# Patient Record
Sex: MALE | Race: WHITE | NOT HISPANIC OR LATINO | Employment: OTHER | URBAN - METROPOLITAN AREA
[De-identification: names, ages, dates, MRNs, and addresses within clinical notes are randomized per-mention and may not be internally consistent; named-entity substitution may affect disease eponyms.]

---

## 2017-01-12 ENCOUNTER — OFFICE VISIT (OUTPATIENT)
Dept: UROLOGY | Facility: CLINIC | Age: 82
End: 2017-01-12
Payer: MEDICARE

## 2017-01-12 DIAGNOSIS — C60.9 PENILE CANCER: Primary | ICD-10-CM

## 2017-01-12 PROCEDURE — 99499 UNLISTED E&M SERVICE: CPT | Mod: S$GLB,,, | Performed by: STUDENT IN AN ORGANIZED HEALTH CARE EDUCATION/TRAINING PROGRAM

## 2017-01-12 RX ORDER — HEPARIN SODIUM 5000 [USP'U]/ML
5000 INJECTION, SOLUTION INTRAVENOUS; SUBCUTANEOUS EVERY 8 HOURS
Status: CANCELLED | OUTPATIENT
Start: 2017-01-12

## 2017-01-12 NOTE — PROGRESS NOTES
Urology ([facility]) H&P  Staff: Ayan Resendiz MD    Is this patient in a research study?  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  He re-presented in 06/2015 with inguinal adenopathy a suprapubic mass and underwent right superficial and deep inguinal LND and left superficial inguinal LND 7/15/2015.  Pathology positive for 4.5 cm focus of SCC in suprapubic area with positive lateral and deep margin, metastatic SCC in 1/9 right inguinal nodes with extranodal extension and 1/6 left inguinal nodes with extranodal extension.      CT of the pelvis on 12/16/15 revealed multiple new soft tissue attenuation lesions noted in the groin and at the base of the penile stump. There is a lesion within the stump of the penis measuring 3.6 cm. This was not present on prior study. A lesion just left of the base of the penis measures up to 5.1 cm in diameter. Right inguinal lesion adjacent to surgical clips measures 2.5 cm in diameter. Heterogeneous collection extending along the left external iliac vessels into the left pelvic sidewall is also seen. These lesions demonstrate internal hypoattenuation suggesting necrosis. These findings were concerning for recurrent disease.       The patient was seen in multidisciplinary clinic in January of this year. We recommended further therapy with chemoradiotherapy. The patient subsequently completed 46 Gy to the pelvis followed by 20 Gy boost to the involved lymph nodes on 3/24/16 at [facility]. He did receive chemotherapy with his radiotherapy but the type is unknown. Since that time, the patient has been doing fairly well without evidence of progression. He had a recent CT in [place] per Dr. Wang.       The patient notes he has noted a skin nodule on the abdomen since the  beginning of December 2016. On last examination he was noted to have 3 masses on the abdominal wall. PET scan 12/15 demonstrated that his abdominal wall lesions are hypermetabolic.  There is also a superficial hypermetabolic lesion over his right hip.  He also has scrotal lesion to the level of the base of the penis on the left.      ROS:  Neg except per HPI    Past Medical History   Diagnosis Date    Allergy     Benign colon polyp     Benign essential hypertension     Cataracts, both eyes      Removed    Non-small cell cancer of left lung     Squamous cell carcinoma of penis        Past Surgical History   Procedure Laterality Date    Hernia repair      Lung lobectomy Left      Partial lung removed     Cataract extraction Bilateral     Appendectomy      Colon surgery      Cystoscopy      Penis surgery  4/13/2015     Partial penectomy for penile cancer     Lymph node dissection Bilateral 7/15/2015     Bilateral superficial inguinal lymph node dissection       Social History     Social History    Marital status:      Spouse name: N/A    Number of children: N/A    Years of education: N/A     Social History Main Topics    Smoking status: Former Smoker     Packs/day: 3.00     Years: 40.00     Types: Cigarettes     Quit date: 2/19/1985    Smokeless tobacco: Current User    Alcohol use 0.0 oz/week     0 Standard drinks or equivalent per week      Comment: 1-2 a day    Drug use: No    Sexual activity: Not Currently     Partners: Female     Other Topics Concern    Not on file     Social History Narrative       Family History   Problem Relation Age of Onset    Diabetes Mother        Review of patient's allergies indicates:   Allergen Reactions    Penicillins Other (See Comments)     Unknown         Current Outpatient Prescriptions on File Prior to Visit   Medication Sig Dispense Refill    aspirin (ECOTRIN) 81 MG EC tablet Take 81 mg by mouth once daily.      docusate sodium (COLACE) 50 MG  capsule Take 1 capsule (50 mg total) by mouth 2 (two) times daily. 60 capsule 0    magnesium oxide (MAG-OX) 400 mg tablet Take 2 tablets by mouth 3 (three) times daily.  3    silver sulfADIAZINE 1% (SILVADENE) 1 % cream       VIT C/VIT E/LUTEIN/MIN/OMEGA-3 (OCUVITE ORAL) Take by mouth.       No current facility-administered medications on file prior to visit.        Anticoagulation:  Yes aspirin, will hold    Physical Exam:  weight 202 lb/91.6 kg  BMI 35    AAOx4,  NC/AT, EOMI, PER, sclerae anicteric, edentulous   Nl effort  RRR  Soft, non-tender, non-distended  Three superficial soft tissue masses under pannus in suprapubic region ranging from 2-6cm  Penile stump retracted into edematous scrotum, testes and cord structures difficult to palpate  Superficial soft tissue mass overlying right hip, approx 2 cm    Labs:      Lab Results   Component Value Date    WBC 7.90 12/16/2015    HGB 12.5 (L) 12/16/2015    HCT 39.2 (L) 12/16/2015    MCV 89 12/16/2015     12/16/2015       BMP  Lab Results   Component Value Date     12/16/2015    K 4.8 12/16/2015     12/16/2015    CO2 26 12/16/2015    BUN 15 12/16/2015    CREATININE 1.0 12/16/2015    CALCIUM 9.1 12/16/2015    ANIONGAP 13 12/16/2015    ESTGFRAFRICA >60.0 12/16/2015    EGFRNONAA >60.0 12/16/2015       No results found for: PSA    Imaging:      PET CT 12/15/2016:  Multiple hypermetabolic subcutaneous soft tissue masses along lower anterior abdominal wall and overlying right hip      In this patient with a history of penile cancer, there are multiple subcutaneous soft tissue densities along the anterior abdominal wall and the posterior right thigh which demonstrate hypermetabolic activity.  Of these, the lesion demonstrating the greatest intensity is at the level of the pubic symphysis to the right of the midline, SUV max measures 29.1.  The most superior abdominal wall lesion lies just above the umbilicus.  Additionally, fluid density lesions are  seen as described on prior abdominal CT 12/16/2015.  The right inguinal lesion demonstrates no hypermetabolic activity.  The larger left lesion adjacent to the base of the penis demonstrates hypermetabolic activity at its inferior most aspect with SUV max of 18.2.  This appearance is consistent with a necrotic node.  A right external iliac chain lymph node demonstrates increased uptake with SUV max of 9.2.  There is extensive body wall edema of the lower abdominal wall, scrotum, and medial thighs.    Incidental CT findings:  There is calcific atherosclerosis of the aorta, branch vessels, and coronary arteries.  There is scarring at the right lung apex and subsegmental atelectasis on the left.  There are postsurgical changes in the bilateral inguinal regions.  There are degenerative changes of the spine.      Assessment: Antonio Jain is a 84 y.o. male with metastatic SCC of the penis with multiple subcutaneous soft tissue masses     Plan:     1. To OR on 1/23/2016 for excision of subcutaneous soft tissue masses of anterior abdominal wall, possibly right lateral thigh, any indicated procedure  2. Consents signed   3. I have explained the risk, benefits, and alternatives of the procedure in detail.  I had a long discussion with the patient and his daughter during which we discussed that these lesions are most likely metastatic penile cancer, that this surgery will not cure him of penile cancer, and that even if we are able to remove these lesions in their entirety, there is a good chance that they will come back or that more lesions will grow in different locations.  We discussed the risks of chronic, non-healing wounds, need for further surgeries, risk of infection, sepsis, and death.  The patient voices understanding that this surgery is not meant to be curative, and he understands the aforementioned risks completely.  He knows that the alternative would be no surgical intervention, and that there is no immediate  risk of not undergoing surgical intervention.  The patient voices understanding and all questions have been answered. The patient agrees to proceed as planned.       Brooks Vasquez MD

## 2017-01-13 ENCOUNTER — ANESTHESIA EVENT (OUTPATIENT)
Dept: SURGERY | Facility: HOSPITAL | Age: 82
DRG: 571 | End: 2017-01-13
Payer: MEDICARE

## 2017-01-13 NOTE — ANESTHESIA PREPROCEDURE EVALUATION
Pre Admission Screening  Johanna Tang RN      []Hide copied text  Anesthesia Assessment: Preoperative EQUATION     Planned Procedure: Procedure(s) (LRB):  REPAIR-ABDOMINAL WALL/ resection of abdominal wall and suprapubic metastasis/ open (N/A)  Requested Anesthesia Type:General  Surgeon: Ayan Resendiz MD  Service: Urology  Known or anticipated Date of Surgery:1/23/2017     Surgeon notes: reviewed     Electronic QUestionnaire Assessment completed via nurse interview with patient.                    Antonio Jain [[id number]] - 84 y.o. Male         Providers Outside of [facility]             Admission (Pending) from 1/23/2017 in [facility]      Has outside PCP   Yes        Surgical Risk Level       Surgical Risk Level:   0              caRDScore (Clinical Anesthesia Rapid Decision Score)         Very Very Low  Total Score: 5       5 Sum of Clinical Scores        caRDScores (Grouped)       caRDScore - Ane:   0                       caRDScore - CVD:   0                       caRDScore - Pul:   2                       caRDScore - Met:   0                       caRDScore - Phy:   3              caRDScore Items             Admission (Pending) from 1/23/2017 in [facility]      Anesthesia        CVD        Activity similar to best ability for maximal activity or exercise   METS 4      Pulmonary        Total smoking adds up to 20 - 40 years   Yes      Metabolic        Physiologic        Obesity Status   Mild Obesity (BMI 30-34.9)      Had major surgery for Ca with resection of vital organ tissue (Brain, Lung, Liver, Kidney-please specify)   Yes [lung cancer approx 13 yrs ago-left lobe resection]      Cancer currently disseminated (metastatic)   Yes        Flags       Red Flag Score:   0                       Yellow Flag Score:   3              Red Flags             Admission (Pending) from 1/23/2017 in [facility]      Obesity Status   Mild Obesity (BMI 30-34.9)         Yellow Flags             Admission (Pending) from 1/23/2017 in [facility]      Takes herbal medications or vitamin supplements   Yes [magnesium]      Obesity Status   Mild Obesity (BMI 30-34.9)      Is or has been a Smoker   Yes      Cancer currently disseminated (metastatic)   Yes        PONV Risk Score (assumes periop narcotic use = +1, Max=4)       PONV Risk Score:   2              PONV Risk Factors  Total Score: 1       1 Non-Smoker at present        Sleep Apnea  Total Score: 0         KAILYN STOP-Bang Risk Factors (Max=8)  Total Score: 2       1 Age >50      1 Male        KAILYN Risk Level - 1 (Low), 2 (Moderate), 3 (High)       KALIYN Risk Level:   1              RCRI (Revised Cardiac Risk Indices of ACC/AHA guidelines, Max=6)  Total Score: 0         CAD Risk Factors  Total Score: 2       1 Male. Age >45      1 Total smoking adds up to 20 - 40 years        CHADS Score if applicable (history of atrial fib/flutter, Max=6)  Total Score: 1       1 Age >75        Maximal Exercise Capacity             Admission (Pending) from 1/23/2017 in [facility]      Maximal Exercise Capacity   METS 4        Summary of Dependence  Total Score: 1       1 Is totally independent of others for activities of daily living        Phone Fraility Score (Max = 17)  Total Score: 0         Pain Factors       No data to display        Risk Triggers (Evidence-Based Risk Triggers)         Pulmonary Risk Triggers  Total Score: 3       1 Age > or = 60      1 Obesity Status: Mild Obesity (BMI 30-34.9)      1 Total smoking adds up to 20 - 40 years        Renal Risk Triggers  Total Score: 0         Delirium Risk Triggers  Total Score: 1       1 Age > or = 75        Urologic Risk Triggers  Total Score: 0         Logistics         Pre-op Clinic Logistics  Total Score: 5       1 Has outside PCP      2 Takes herbal medications or vitamin supplements      1 Has had anesthesia, either as adult or as a child      1  Previous transfusions        [facility] Logistics  Total Score: 0         Discharge Logistics  Total Score: 0         Discharge Planning             Admission (Pending) from 1/23/2017 in [facility]      Discharge Planning        Will assist patient 24/7, if needed   daughter      Who will transport you to therapy, if need   daughter        Fast Track <For office use only>       Total Score: 6          Surgical Risk Level Assessment                    Triage considerations:      The patient has no apparent active cardiac condition (No unstable coronary Syndrome such as severe unstable angina or recent [<1 month] myocardial infarction, decompensated CHF, severe valvular disease or significant arrhythmia)     Previous anesthesia records:GETA, MAC and No problems 7/2015 lymph node dissection Method of Intubation: Direct laryngoscopy; Inserted by: Anesthesia Resident; Airway Device: Endotracheal Tube; Mask Ventilation: Easy - oral; Intubated: Postinduction; Blade: Bowman #2; Airway Device Size: 8.0; Cuff Inflation: Minimal occlusive pressure; Placement Verified By: Auscultation, Capnometry; Grade: Grade I; Complicating Factors: None; Intubation Findings: Positive EtCO2, Bilateral breath sounds, Atraumatic/Condition of teeth unchanged;  Depth of Insertion: 24; Securment: Lips; Complications: None; Breath Sounds: Equal Bilateral; Insertion Attempts: 1; Removal Date: 07/15/15;      Last PCP note: 3-6 months ago , outside [facility]   Subspecialty notes: Hematology/Oncology     Other important co-morbidities: penile cancer/abdominal lesions, HX lung cancer/resection, HX HTN      Tests already available:  Available tests, > 1 year ago . 2015 EKG, BMP and CBC      Instructions given. (See in Nurse's note)     Optimization:  Anesthesia Preop Clinic Assessment Indicated-pt lives out of town and has prior anesthesia record      Plan:   Testing: Hematology Profile and BMP  Patient has previously scheduled Medical  Appointment:none     Navigation: Tests Scheduled. Am of surgery      Straight Line to surgery.       Electronically signed by Johanna Tang RN at 1/13/2017 11:46 AM        Admission (Pending) on 1/23/2017              Detailed Report                                                                                                                        01/13/2017  Antonio Jain is a 84 y.o., male with PMHx significant for HTN, NScCA L lung s/p partial lobectomy, SCC-glans penis s/p partial penectomy (4/13/15) and excision suprapubic mass and B inguinal lymph node dissection (7/15/15) and s/p chemoradiotherapy who presents with multiple metabolically active new lesions. Pt is scheduled for resection of cutaneous lesions and possible L inguinal mass resection.         Pre-operative evaluation for Procedure(s) (LRB):  REPAIR-ABDOMINAL WALL/ resection of abdominal wall and suprapubic metastasis/ open (N/A)    Antonio Jain is a 84 y.o. male     Patient Active Problem List   Diagnosis    Phimosis    ANA (acute kidney injury)    Hyperkalemia    Benign essential hypertension    Squamous cell carcinoma of penis    Dehydration    Regional lymph node metastasis present       Review of patient's allergies indicates:   Allergen Reactions    Penicillins Other (See Comments)     Unknown         No current facility-administered medications on file prior to encounter.      Current Outpatient Prescriptions on File Prior to Encounter   Medication Sig Dispense Refill    magnesium oxide (MAG-OX) 400 mg tablet Take 2 tablets by mouth 3 (three) times daily.  3    VIT C/VIT E/LUTEIN/MIN/OMEGA-3 (OCUVITE ORAL) Take by mouth.      aspirin (ECOTRIN) 81 MG EC tablet Take 81 mg by mouth once daily.      docusate sodium (COLACE) 50 MG capsule Take 1 capsule (50 mg total) by mouth 2 (two) times daily. 60 capsule 0    silver sulfADIAZINE 1% (SILVADENE) 1 % cream          Past Surgical History   Procedure Laterality Date    Hernia  repair      Lung lobectomy Left      Partial lung removed     Cataract extraction Bilateral     Appendectomy      Colon surgery      Cystoscopy      Penis surgery  2015     Partial penectomy for penile cancer     Lymph node dissection Bilateral 7/15/2015     Bilateral superficial inguinal lymph node dissection       Social History     Social History    Marital status:      Spouse name: N/A    Number of children: N/A    Years of education: N/A     Occupational History    Not on file.     Social History Main Topics    Smoking status: Former Smoker     Packs/day: 3.00     Years: 40.00     Types: Cigarettes     Quit date: 1985    Smokeless tobacco: Current User    Alcohol use 0.0 oz/week     0 Standard drinks or equivalent per week      Comment: 1-2 a day    Drug use: No    Sexual activity: Not Currently     Partners: Female     Other Topics Concern    Not on file     Social History Narrative         Vital Signs Range (Last 24H):         CBC: No results for input(s): WBC, RBC, HGB, HCT, PLT, MCV, MCH, MCHC in the last 72 hours.    CMP: No results for input(s): NA, K, CL, CO2, BUN, CREATININE, GLU, MG, PHOS, CALCIUM, ALBUMIN, PROT, ALKPHOS, ALT, AST, BILITOT in the last 72 hours.    INR  No results for input(s): INR, PROTIME, APTT in the last 72 hours.    Invalid input(s): PT        Diagnostic Studies:      EK17  Vent. rate 181 BPM  MI interval 192 ms  QRS duration 110 ms  QT/QTc 190/329 ms  P-R-T axes 71 -45 77  /56  Sinus tachycardia with frequent Premature ventricular complexes in a pattern of bigeminy  Left axis deviation  Otherwise normal ECG          [facility] Anesthesia Evaluation    I have reviewed the Patient Summary Reports.    I have reviewed the Nursing Notes.   I have reviewed the Medications.     Review of Systems  Anesthesia Hx:  No problems with previous Anesthesia Denies Hx of Anesthetic complications  History of prior surgery of interest to airway management or  planning: Denies Family Hx of Anesthesia complications.   Denies Personal Hx of Anesthesia complications.   Hematology/Oncology:  Hematology Normal       -- Cancer in past history:  chemotherapy and surgery  Oncology Comments: Squamous cell carcinoma of the penis     EENT/Dental:EENT/Dental Normal   Cardiovascular:   Hypertension, well controlled NYHA Classification I ECG has been reviewed.    Pulmonary:  Pulmonary Normal    Renal/:   Chronic Renal Disease, CRI    Hepatic/GI:  Hepatic/GI Normal    Musculoskeletal:  Musculoskeletal Normal    Neurological:  Neurology Normal    Endocrine:  Endocrine Normal    Dermatological:  Skin Normal    Psych:  Psychiatric Normal           Physical Exam  General:  Well nourished    Airway/Jaw/Neck:  Airway Findings: Mouth Opening: Normal Tongue: Normal  General Airway Assessment: Adult  Mallampati: II  Jaw/Neck Findings:  Neck ROM: Normal ROM     Eyes/Ears/Nose:  Eyes/Ears/Nose Findings:    Dental:  Dental Findings: In tact   Chest/Lungs:  Chest/Lungs Findings: Clear to auscultation, Normal Respiratory Rate     Heart/Vascular:  Heart Findings: Rate: Normal  Rhythm: Regular Rhythm  Sounds: Normal  Heart Murmur  Vascular Findings:        Mental Status:  Mental Status Findings:  Cooperative, Alert and Oriented         Anesthesia Plan  Type of Anesthesia, risks & benefits discussed:  Anesthesia Type:  general  Patient's Preference: General  Intra-op Monitoring Plan:   Intra-op Monitoring Plan Comments:   Post Op Pain Control Plan:   Post Op Pain Control Plan Comments:   Induction:   IV  Beta Blocker:  Patient is not currently on a Beta-Blocker (No further documentation required).       Informed Consent: Patient understands risks and agrees with Anesthesia plan.  Questions answered. Anesthesia consent signed with patient.  ASA Score: 3     Day of Surgery Review of History & Physical:    H&P update referred to the surgeon.     Anesthesia Plan Notes: Discussed plan for general  endotracheal anesthesia, pt understands and agrees with plan        Ready For Surgery From Anesthesia Perspective.

## 2017-01-23 ENCOUNTER — ANESTHESIA (OUTPATIENT)
Dept: SURGERY | Facility: HOSPITAL | Age: 82
DRG: 571 | End: 2017-01-23
Payer: MEDICARE

## 2017-01-23 PROBLEM — R22.2 ABDOMINAL WALL MASS: Status: ACTIVE | Noted: 2017-01-23

## 2017-01-23 PROBLEM — C60.9 PENILE CANCER: Status: ACTIVE | Noted: 2017-01-23

## 2017-01-23 PROBLEM — L02.211 ABDOMINAL WALL ABSCESS: Status: ACTIVE | Noted: 2017-01-23

## 2017-01-23 PROCEDURE — 63600175 PHARM REV CODE 636 W HCPCS: Performed by: ANESTHESIOLOGY

## 2017-01-23 PROCEDURE — D9220A PRA ANESTHESIA: Mod: ,,, | Performed by: ANESTHESIOLOGY

## 2017-01-23 PROCEDURE — 25000003 PHARM REV CODE 250: Performed by: ANESTHESIOLOGY

## 2017-01-23 RX ORDER — CLINDAMYCIN PHOSPHATE 150 MG/ML
INJECTION, SOLUTION INTRAVENOUS
Status: DISCONTINUED | OUTPATIENT
Start: 2017-01-23 | End: 2017-01-23

## 2017-01-23 RX ORDER — GLYCOPYRROLATE 0.2 MG/ML
INJECTION INTRAMUSCULAR; INTRAVENOUS
Status: DISCONTINUED | OUTPATIENT
Start: 2017-01-23 | End: 2017-01-23

## 2017-01-23 RX ORDER — ONDANSETRON 2 MG/ML
INJECTION INTRAMUSCULAR; INTRAVENOUS
Status: DISCONTINUED | OUTPATIENT
Start: 2017-01-23 | End: 2017-01-23

## 2017-01-23 RX ORDER — NEOSTIGMINE METHYLSULFATE 1 MG/ML
INJECTION, SOLUTION INTRAVENOUS
Status: DISCONTINUED | OUTPATIENT
Start: 2017-01-23 | End: 2017-01-23

## 2017-01-23 RX ORDER — DEXAMETHASONE SODIUM PHOSPHATE 4 MG/ML
INJECTION, SOLUTION INTRA-ARTICULAR; INTRALESIONAL; INTRAMUSCULAR; INTRAVENOUS; SOFT TISSUE
Status: DISCONTINUED | OUTPATIENT
Start: 2017-01-23 | End: 2017-01-23

## 2017-01-23 RX ORDER — ROCURONIUM BROMIDE 10 MG/ML
INJECTION, SOLUTION INTRAVENOUS
Status: DISCONTINUED | OUTPATIENT
Start: 2017-01-23 | End: 2017-01-23

## 2017-01-23 RX ORDER — KETAMINE HCL IN 0.9 % NACL 50 MG/5 ML
SYRINGE (ML) INTRAVENOUS
Status: DISCONTINUED | OUTPATIENT
Start: 2017-01-23 | End: 2017-01-23

## 2017-01-23 RX ORDER — PHENYLEPHRINE HYDROCHLORIDE 10 MG/ML
INJECTION INTRAVENOUS
Status: DISCONTINUED | OUTPATIENT
Start: 2017-01-23 | End: 2017-01-23

## 2017-01-23 RX ORDER — ACETAMINOPHEN 10 MG/ML
INJECTION, SOLUTION INTRAVENOUS
Status: DISCONTINUED | OUTPATIENT
Start: 2017-01-23 | End: 2017-01-23

## 2017-01-23 RX ORDER — LIDOCAINE HYDROCHLORIDE ANHYDROUS AND DEXTROSE MONOHYDRATE .8; 5 G/100ML; G/100ML
INJECTION, SOLUTION INTRAVENOUS CONTINUOUS PRN
Status: DISCONTINUED | OUTPATIENT
Start: 2017-01-23 | End: 2017-01-23

## 2017-01-23 RX ORDER — SODIUM CHLORIDE 9 MG/ML
INJECTION, SOLUTION INTRAVENOUS CONTINUOUS PRN
Status: DISCONTINUED | OUTPATIENT
Start: 2017-01-23 | End: 2017-01-23

## 2017-01-23 RX ADMIN — ROCURONIUM BROMIDE 40 MG: 10 INJECTION, SOLUTION INTRAVENOUS at 12:01

## 2017-01-23 RX ADMIN — GLYCOPYRROLATE 0.6 MG: 0.2 INJECTION, SOLUTION INTRAMUSCULAR; INTRAVENOUS at 01:01

## 2017-01-23 RX ADMIN — ROCURONIUM BROMIDE 10 MG: 10 INJECTION, SOLUTION INTRAVENOUS at 01:01

## 2017-01-23 RX ADMIN — DEXMEDETOMIDINE HYDROCHLORIDE 0.5 MCG/KG/HR: 100 INJECTION, SOLUTION, CONCENTRATE INTRAVENOUS at 12:01

## 2017-01-23 RX ADMIN — DEXAMETHASONE SODIUM PHOSPHATE 8 MG: 4 INJECTION, SOLUTION INTRAMUSCULAR; INTRAVENOUS at 12:01

## 2017-01-23 RX ADMIN — PHENYLEPHRINE HYDROCHLORIDE 100 MCG: 10 INJECTION INTRAVENOUS at 01:01

## 2017-01-23 RX ADMIN — SODIUM CHLORIDE: 0.9 INJECTION, SOLUTION INTRAVENOUS at 12:01

## 2017-01-23 RX ADMIN — CLINDAMYCIN PHOSPHATE 900 MG: 150 INJECTION, SOLUTION INTRAMUSCULAR; INTRAVENOUS at 12:01

## 2017-01-23 RX ADMIN — LIDOCAINE HYDROCHLORIDE 0.01 MG/KG/MIN: 8 INJECTION, SOLUTION INTRAVENOUS at 12:01

## 2017-01-23 RX ADMIN — NEOSTIGMINE METHYLSULFATE 5 MG: 1 INJECTION INTRAVENOUS at 01:01

## 2017-01-23 RX ADMIN — ONDANSETRON 4 MG: 2 INJECTION INTRAMUSCULAR; INTRAVENOUS at 12:01

## 2017-01-23 RX ADMIN — Medication 30 MG: at 12:01

## 2017-01-23 RX ADMIN — ACETAMINOPHEN 1000 MG: 10 INJECTION, SOLUTION INTRAVENOUS at 12:01

## 2017-01-23 NOTE — TRANSFER OF CARE
"Anesthesia Transfer of Care Note    Patient: Antonio Jain    Procedure(s) Performed: Procedure(s) (LRB):  REPAIR-ABDOMINAL WALL/ resection of abdominal wall and suprapubic metastasis/ open (N/A)    Patient location: PACU    Anesthesia Type: general    Transport from OR: Transported from OR on 6-10 L/min O2 by face mask with adequate spontaneous ventilation    Post pain: adequate analgesia    Post assessment: no apparent anesthetic complications    Post vital signs: stable    Level of consciousness: awake    Nausea/Vomiting: no nausea/vomiting    Complications: none          Last vitals:   Visit Vitals    BP (!) 177/73 (BP Location: Left arm, Patient Position: Lying, BP Method: Automatic)    Pulse 82    Temp 36.6 °C (97.8 °F) (Oral)    Ht 5' 4" (1.626 m)    Wt 105.2 kg (232 lb)    SpO2 98%    BMI 39.82 kg/m2     "

## 2017-01-24 NOTE — ANESTHESIA RELEASE NOTE
"Anesthesia Release from PACU Note    Patient: Antonio Jain    Procedure(s) Performed: Procedure(s) (LRB):  REPAIR-ABDOMINAL WALL/ resection of abdominal wall and suprapubic metastasis/ open (N/A)    Anesthesia type: general    Post pain: Adequate analgesia    Post assessment: no apparent anesthetic complications    Last Vitals:   Visit Vitals    BP (!) 132/58    Pulse 83    Temp 36.4 °C (97.5 °F) (Axillary)    Resp 15    Ht 5' 4" (1.626 m)    Wt 105.2 kg (232 lb)    SpO2 95%    BMI 39.82 kg/m2       Post vital signs: stable    Level of consciousness: awake, alert  and oriented    Nausea/Vomiting: no nausea/no vomiting    Complications: none    Airway Patency: patent    Respiratory: unassisted    Cardiovascular: stable and blood pressure at baseline    Hydration: euvolemic  "

## 2017-01-24 NOTE — ANESTHESIA POSTPROCEDURE EVALUATION
"Anesthesia Post Evaluation    Patient: Antonio Jain    Procedure(s) Performed: Procedure(s) (LRB):  REPAIR-ABDOMINAL WALL/ resection of abdominal wall and suprapubic metastasis/ open (N/A)    Final Anesthesia Type: general  Patient location during evaluation: PACU  Patient participation: Yes- Able to Participate  Level of consciousness: awake and alert  Post-procedure vital signs: reviewed and stable  Pain management: adequate  Airway patency: patent  PONV status at discharge: No PONV  Anesthetic complications: no      Cardiovascular status: blood pressure returned to baseline  Respiratory status: unassisted  Hydration status: euvolemic  Follow-up not needed.        Visit Vitals    BP (!) 132/58    Pulse 83    Temp 36.4 °C (97.5 °F) (Axillary)    Resp 15    Ht 5' 4" (1.626 m)    Wt 105.2 kg (232 lb)    SpO2 95%    BMI 39.82 kg/m2       Pain/Rodriguez Score: Pain Assessment Performed: Yes (1/23/2017  3:10 PM)  Presence of Pain: denies (1/23/2017  3:10 PM)      "

## 2017-01-27 ENCOUNTER — OFFICE VISIT (OUTPATIENT)
Dept: UROLOGY | Facility: CLINIC | Age: 82
End: 2017-01-27
Payer: MEDICARE

## 2017-01-27 DIAGNOSIS — Z98.890 POST-OPERATIVE STATE: Primary | ICD-10-CM

## 2017-01-27 PROCEDURE — 99999 PR PBB SHADOW E&M-EST. PATIENT-LVL II: CPT | Mod: PBBFAC,,, | Performed by: NURSE PRACTITIONER

## 2017-01-27 PROCEDURE — 99024 POSTOP FOLLOW-UP VISIT: CPT | Mod: S$GLB,,, | Performed by: NURSE PRACTITIONER

## 2017-01-27 NOTE — MR AVS SNAPSHOT
[facility] 4th Floor  [address]  [place] [address]  Phone: [phone]                  Antonio Jain   2017 10:20 AM   Office Visit    Description:  Male : 1932   Provider:  Karen Finley NP   Department:  [facility] 4th Floor           Reason for Visit     Post-op Evaluation           Diagnoses this Visit        Comments    Post-operative state    -  Primary            To Do List           Future Appointments        Provider Department Dept Phone    2017 10:00 AM Ayan [facility]endiz MD [facility] [phone]      Goals (5 Years of Data)     None      Follow-Up and Disposition     Return if symptoms worsen or fail to improve.      [facility]     [facility] On [facility] Nurse Care Line -  Assistance  Registered nurses in the [facility] On Call Center provide clinical advisement, health education, appointment booking, and other advisory services.  Call for this free service at [phone].             Medications           Message regarding Medications     Verify the changes and/or additions to your medication regime listed below are the same as discussed with your clinician today.  If any of these changes or additions are incorrect, please notify your healthcare provider.             Verify that the below list of medications is an accurate representation of the medications you are currently taking.  If none reported, the list may be blank. If incorrect, please contact your healthcare provider. Carry this list with you in case of emergency.           Current Medications     aspirin (ECOTRIN) 81 MG EC tablet Take 81 mg by mouth once daily.    docusate sodium (COLACE) 50 MG capsule Take 1 capsule (50 mg total) by mouth 2 (two) times daily.    magnesium oxide (MAG-OX) 400 mg tablet Take 2 tablets by mouth 3 (three) times daily.    oxycodone-acetaminophen (PERCOCET) 5-325 mg per tablet Take 1 tablet by mouth every 4 (four) hours as needed for  Pain.    polyethylene glycol (GLYCOLAX) 17 gram PwPk Take 17 g by mouth once daily.    silver sulfADIAZINE 1% (SILVADENE) 1 % cream     sulfamethoxazole-trimethoprim 800-160mg (BACTRIM DS) 800-160 mg Tab Take 1 tablet by mouth 2 (two) times daily.    VIT C/VIT E/LUTEIN/MIN/OMEGA-3 (OCUVITE ORAL) Take by mouth.           Clinical Reference Information           Allergies as of 1/27/2017     Penicillins      Immunizations Administered on Date of Encounter - 1/27/2017     None      [facility] Sign-Up     Activating your [facility] account is as easy as 1-2-3!     1) Visit [email or web address], select Sign Up Now, enter this activation code and your date of birth, then select Next.  [email or web address]  Expires: 3/10/2017  9:09 AM      2) Create a username and password to use when you visit [facility] in the future and select a security question in case you lose your password and select Next.    3) Enter your e-mail address and click Sign Up!    Additional Information  If you have questions, please e-mail [email or web address] or call [phone] to talk to our [facility] staff. Remember, [facility] is NOT to be used for urgent needs. For medical emergencies, dial 911.

## 2017-01-27 NOTE — PROGRESS NOTES
Subjective:       Patient ID: Antonio Jain is a 84 y.o. male.    Chief Complaint: Post-op Evaluation    HPI Comments: Antonio Jain is a 84 y.o. 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   The patient's disease has progressed after treatment with chemotherapy and radiation.   His developed anterior abdominal wall lesions concerning for metastatic disease on PET scan and with areas of purulent drainage.   He presents 01/23/2017 for surgical intervention.     Procedure(s) Performed:   1. Repair of abdominal wall - resection of abdominal wall and right thigh masses - open     Specimen(s):   1. Anterior abdominal wall masses and skin  2. Right lateral thigh abscess     Findings:   1. Four anterior abdominal wall lesions concerning for metastatic SCC with necrosis and purulence. A wide elliptical exicision was performed. The lesions did not appear to invade into the underlying fat. The skin and subcutaneous tissues were released over the eliseo's fascia to allow for closure. This was done in several layers, a drain was placed.   2. The thigh mass was excised without evidence of invasion to the underlying fat, this was closed in several layers  3. A 10mm marc drain was placed at the base of the abdominal incision.    Pathology is still processing   He is here today for removal of drain    He voices no complaints.  States not much drainage into drain; more clear drainage from around it.          Review of Systems    Objective:      Physical Exam   Genitourinary:             Assessment:       1. Post-operative state        Plan:         I easily removed COREY drain and applied pressure dressing.  I changed the dsg to right groin and thigh.  We discuss post op expectation.   Have him RTC 2-3 weeks for staple removal.

## 2017-02-01 ENCOUNTER — TELEPHONE (OUTPATIENT)
Dept: UROLOGY | Facility: CLINIC | Age: 82
End: 2017-02-01

## 2017-02-01 NOTE — TELEPHONE ENCOUNTER
----- Message from Karen Filney NP sent at 2/1/2017  2:06 PM CST -----  Contact: Pt Isadora:[phone]  He scheduled for 2/23/2016 but would have him keeping his staples in for one month.  He was to be seen 2 weeks after his surgery on 01/23/2017.  Thank you.          ----- Message -----     From: Dhaval Peacock LPN     Sent: 2/1/2017   1:54 PM       To: Karen Finley NP    Is he supposed to have an appt to get the staples out with you or are they taking them out when he sees Dr. Resendiz?    ----- Message -----     From: Carlo Bynmu     Sent: 2/1/2017  12:11 PM       To: Tushar SMITH Staff    Pt wife called and states she would like to know if Karen had touched bases with  in regards to getting pt staples removed.

## 2017-02-09 ENCOUNTER — OFFICE VISIT (OUTPATIENT)
Dept: UROLOGY | Facility: CLINIC | Age: 82
End: 2017-02-09
Payer: MEDICARE

## 2017-02-09 VITALS
HEART RATE: 92 BPM | WEIGHT: 232 LBS | HEIGHT: 64 IN | DIASTOLIC BLOOD PRESSURE: 82 MMHG | BODY MASS INDEX: 39.61 KG/M2 | RESPIRATION RATE: 16 BRPM | SYSTOLIC BLOOD PRESSURE: 134 MMHG

## 2017-02-09 DIAGNOSIS — C60.9 PENILE CANCER: Primary | ICD-10-CM

## 2017-02-09 PROCEDURE — 99999 PR PBB SHADOW E&M-EST. PATIENT-LVL III: CPT | Mod: PBBFAC,,, | Performed by: UROLOGY

## 2017-02-09 PROCEDURE — 99499 UNLISTED E&M SERVICE: CPT | Mod: S$GLB,,, | Performed by: UROLOGY

## 2017-02-09 NOTE — MR AVS SNAPSHOT
[facility]  [address]  [facility] [address]  Phone: [phone]                  Antonio Jain   2017 9:30 AM   Office Visit    Description:  Male : 1932   Provider:  Ayan Resendiz MD   [facility]           Reason for Visit     squamous cell of carcinoma of penis           Diagnoses this Visit        Comments    Penile cancer    -  Primary            To Do List           Future Appointments        Provider Department Dept Phone    2017 9:45 AM MD [facility] [phone]      Goals (5 Years of Data)     None      Follow-Up and Disposition     Return in about 3 months (around 2017).      [facility]     [facility] On Call Nurse Care Line -  Assistance  Registered nurses in the [facility] On Call Center provide clinical advisement, health education, appointment booking, and other advisory services.  Call for this free service at [phone].             Medications           Message regarding Medications     Verify the changes and/or additions to your medication regime listed below are the same as discussed with your clinician today.  If any of these changes or additions are incorrect, please notify your healthcare provider.             Verify that the below list of medications is an accurate representation of the medications you are currently taking.  If none reported, the list may be blank. If incorrect, please contact your healthcare provider. Carry this list with you in case of emergency.           Current Medications     aspirin (ECOTRIN) 81 MG EC tablet Take 81 mg by mouth once daily.    docusate sodium (COLACE) 50 MG capsule Take 1 capsule (50 mg total) by mouth 2 (two) times daily.    magnesium oxide (MAG-OX) 400 mg tablet Take 2 tablets by mouth 3 (three) times daily.    oxycodone-acetaminophen (PERCOCET) 5-325 mg per tablet Take 1 tablet by mouth every 4 (four) hours as needed for  "Pain.    polyethylene glycol (GLYCOLAX) 17 gram PwPk Take 17 g by mouth once daily.    silver sulfADIAZINE 1% (SILVADENE) 1 % cream     VIT C/VIT E/LUTEIN/MIN/OMEGA-3 (OCUVITE ORAL) Take by mouth.           Clinical Reference Information           Your Vitals Were     BP Pulse Resp Height Weight BMI    134/82 (BP Location: Left arm, Patient Position: Sitting, BP Method: Automatic) 92 16 5' 4" (1.626 m) 105.2 kg (232 lb) 39.82 kg/m2      Blood Pressure          Most Recent Value    BP  134/82      Allergies as of 2/9/2017     Penicillins      Immunizations Administered on Date of Encounter - 2/9/2017     None      [facility] Sign-Up     Activating your [facility] account is as easy as 1-2-3!     1) Visit [email or web address], select Sign Up Now, enter this activation code and your date of birth, then select Next.  [email or web address]  Expires: 3/10/2017  9:09 AM      2) Create a username and password to use when you visit [facility] in the future and select a security question in case you lose your password and select Next.    3) Enter your e-mail address and click Sign Up!    Additional Information  If you have questions, please e-mail [email or web address] or call [phone] to talk to our [facility] staff. Remember, [facility] is NOT to be used for urgent needs. For medical emergencies, dial 911.         Language Assistance Services     ATTENTION: Language assistance services are available, free of charge. Please call [phone].      ATENCIÓN: Si habla español, tiene a tinsley disposición servicios gratuitos de asistencia lingüística. Llame al [phone].     BONNIE Ý: N?u b?n nói Ti?ng Vi?t, có các d?ch v? h? tr? ngôn ng? mi?n phí dành cho b?n. G?i s? [phone].         [facility] complies with applicable Federal civil rights laws and does not discriminate on the basis of race, color, national origin, age, disability, or sex.        "

## 2017-02-09 NOTE — PROGRESS NOTES
Subjective:       Patient ID: Antonio Jain is a 84 y.o. male.    Chief Complaint: squamous cell of carcinoma of penis (2 week postop)    HPI Comments: Antonio Jain is a 84 y.o. 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 He re-presented in 06/2015 with inguinal adenopathy a suprapubic mass and underwent right superficial and deep inguinal LND and left superficial inguinal LND 7/15/2015. Pathology positive for 4.5 cm focus of SCC in suprapubic area with positive lateral and deep margin, metastatic SCC in 1/9 right inguinal nodes with extranodal extension and 1/6 left inguinal nodes with extranodal extension.      CT of the pelvis on 12/16/15 revealed multiple new soft tissue attenuation lesions noted in the groin and at the base of the penile stump. There is a lesion within the stump of the penis measuring 3.6 cm. This was not present on prior study. A lesion just left of the base of the penis measures up to 5.1 cm in diameter. Right inguinal lesion adjacent to surgical clips measures 2.5 cm in diameter. Heterogeneous collection extending along the left external iliac vessels into the left pelvic sidewall is also seen. These lesions demonstrate internal hypoattenuation suggesting necrosis. These findings were concerning for recurrent disease.       The patient was seen in multidisciplinary clinic in January of this year. We recommended further therapy with chemoradiotherapy. The patient subsequently completed 46 Gy to the pelvis followed by 20 Gy boost to the involved lymph nodes on 3/24/16 at [facility]. He did receive chemotherapy with his radiotherapy but the type is unknown. Since that time, the patient has been doing fairly well without evidence of progression.   Recently noted to have multiple abdominal wall metastatic deposits. Had resection 1/23/2017. Path pending.    Past Medical History:    Allergy                                                        Benign colon polyp                                            Benign essential hypertension                                 Cataracts, both eyes                                            Comment:Removed    Non-small cell cancer of left lung                            Squamous cell carcinoma of penis                               Past Surgical History:    HERNIA REPAIR                                                  LUNG LOBECTOMY                                  Left                 Comment:Partial lung removed     CATARACT EXTRACTION                             Bilateral               APPENDECTOMY                                                   COLON SURGERY                                                  CYSTOSCOPY                                                     PENIS SURGERY                                    4/13/2015       Comment:Partial penectomy for penile cancer     LYMPH NODE DISSECTION                           Bilateral 7/15/2015       Comment:Bilateral superficial inguinal lymph node                dissection     Review of Systems    Objective:      Physical Exam    Assessment:       1. Penile cancer        Plan:         Staples removed. No infection. Reviewed path ( received verbally from Dr. Campos). Continue to cleanse skin with Hibiclens.  F/U 3 months.

## 2017-03-22 ENCOUNTER — TELEPHONE (OUTPATIENT)
Dept: UROLOGY | Facility: CLINIC | Age: 82
End: 2017-03-22

## 2017-03-22 NOTE — TELEPHONE ENCOUNTER
----- Message from Katelynn Guzmán sent at 3/22/2017  9:21 AM CDT -----  Contact: Deanne with [facility] Care Manager#[phone]  She states that pt lower abdomen to knee is 2 times bigger than the other one. She states that it's the same side that pt had drain in. Please call Deanne @[phone]

## 2017-03-22 NOTE — TELEPHONE ENCOUNTER
I spoke with Deanne mosqueda/[facility] , who stated when she saw patient yesterday and his leg is very swollen twice the size of his other leg fro his groin down. Patient stated his daughter was aware yesterday and was going to bring to the doctor today, but his daughter wasn't able since she had to work a double so she couldn't bring him , patient called Deanne and told her he didn't go to the doctor and wants to see , I advised Deanne that patient needs to go to the nearest ER for eval today. Deanne stated she will tell patient.

## 2017-05-04 ENCOUNTER — TELEPHONE (OUTPATIENT)
Dept: UROLOGY | Facility: CLINIC | Age: 82
End: 2017-05-04

## 2017-05-04 NOTE — TELEPHONE ENCOUNTER
Spoke with patient, who stated he will speak to his daughter about bring him to a later appt in the day.

## 2017-05-18 ENCOUNTER — OFFICE VISIT (OUTPATIENT)
Dept: UROLOGY | Facility: CLINIC | Age: 82
End: 2017-05-18
Payer: MEDICARE

## 2017-05-18 ENCOUNTER — LAB VISIT (OUTPATIENT)
Dept: LAB | Facility: HOSPITAL | Age: 82
End: 2017-05-18
Attending: UROLOGY
Payer: MEDICARE

## 2017-05-18 VITALS
SYSTOLIC BLOOD PRESSURE: 127 MMHG | BODY MASS INDEX: 31.91 KG/M2 | DIASTOLIC BLOOD PRESSURE: 60 MMHG | HEIGHT: 64 IN | HEART RATE: 78 BPM | RESPIRATION RATE: 16 BRPM | WEIGHT: 186.94 LBS

## 2017-05-18 DIAGNOSIS — C77.9 REGIONAL LYMPH NODE METASTASIS PRESENT: ICD-10-CM

## 2017-05-18 DIAGNOSIS — C60.9 PENILE CANCER: ICD-10-CM

## 2017-05-18 DIAGNOSIS — C60.9 PENILE CANCER: Primary | ICD-10-CM

## 2017-05-18 LAB
ALBUMIN SERPL BCP-MCNC: 2.3 G/DL
ALP SERPL-CCNC: 75 U/L
ALT SERPL W/O P-5'-P-CCNC: 11 U/L
ANION GAP SERPL CALC-SCNC: 7 MMOL/L
AST SERPL-CCNC: 18 U/L
BASOPHILS # BLD AUTO: 0.01 K/UL
BASOPHILS NFR BLD: 0.1 %
BILIRUB SERPL-MCNC: 0.7 MG/DL
BUN SERPL-MCNC: 24 MG/DL
CALCIUM SERPL-MCNC: 9 MG/DL
CHLORIDE SERPL-SCNC: 104 MMOL/L
CO2 SERPL-SCNC: 29 MMOL/L
CREAT SERPL-MCNC: 1.1 MG/DL
DIFFERENTIAL METHOD: ABNORMAL
EOSINOPHIL # BLD AUTO: 0 K/UL
EOSINOPHIL NFR BLD: 0.3 %
ERYTHROCYTE [DISTWIDTH] IN BLOOD BY AUTOMATED COUNT: 16.1 %
EST. GFR  (AFRICAN AMERICAN): >60 ML/MIN/1.73 M^2
EST. GFR  (NON AFRICAN AMERICAN): >60 ML/MIN/1.73 M^2
GLUCOSE SERPL-MCNC: 97 MG/DL
HCT VFR BLD AUTO: 27.8 %
HGB BLD-MCNC: 8.5 G/DL
LYMPHOCYTES # BLD AUTO: 1.1 K/UL
LYMPHOCYTES NFR BLD: 9.7 %
MCH RBC QN AUTO: 26.1 PG
MCHC RBC AUTO-ENTMCNC: 30.6 %
MCV RBC AUTO: 85 FL
MONOCYTES # BLD AUTO: 1.2 K/UL
MONOCYTES NFR BLD: 11.2 %
NEUTROPHILS # BLD AUTO: 8.6 K/UL
NEUTROPHILS NFR BLD: 78.2 %
PLATELET # BLD AUTO: 329 K/UL
PMV BLD AUTO: 8.3 FL
POTASSIUM SERPL-SCNC: 4.9 MMOL/L
PROT SERPL-MCNC: 6.2 G/DL
RBC # BLD AUTO: 3.26 M/UL
SODIUM SERPL-SCNC: 140 MMOL/L
WBC # BLD AUTO: 10.97 K/UL

## 2017-05-18 PROCEDURE — 99213 OFFICE O/P EST LOW 20 MIN: CPT | Mod: S$GLB,,, | Performed by: UROLOGY

## 2017-05-18 PROCEDURE — 80053 COMPREHEN METABOLIC PANEL: CPT

## 2017-05-18 PROCEDURE — 1159F MED LIST DOCD IN RCRD: CPT | Mod: S$GLB,,, | Performed by: UROLOGY

## 2017-05-18 PROCEDURE — 3078F DIAST BP <80 MM HG: CPT | Mod: S$GLB,,, | Performed by: UROLOGY

## 2017-05-18 PROCEDURE — 36415 COLL VENOUS BLD VENIPUNCTURE: CPT

## 2017-05-18 PROCEDURE — 99999 PR PBB SHADOW E&M-EST. PATIENT-LVL III: CPT | Mod: PBBFAC,,, | Performed by: UROLOGY

## 2017-05-18 PROCEDURE — 85025 COMPLETE CBC W/AUTO DIFF WBC: CPT

## 2017-05-18 PROCEDURE — 1160F RVW MEDS BY RX/DR IN RCRD: CPT | Mod: S$GLB,,, | Performed by: UROLOGY

## 2017-05-18 PROCEDURE — 1126F AMNT PAIN NOTED NONE PRSNT: CPT | Mod: S$GLB,,, | Performed by: UROLOGY

## 2017-05-18 PROCEDURE — 3074F SYST BP LT 130 MM HG: CPT | Mod: S$GLB,,, | Performed by: UROLOGY

## 2017-05-18 RX ORDER — LANOLIN ALCOHOL/MO/W.PET/CERES
400 CREAM (GRAM) TOPICAL 2 TIMES DAILY
COMMUNITY

## 2017-05-18 NOTE — MR AVS SNAPSHOT
[facility]  [address]  [facility] [address]  Phone: [phone]                  Antonio Jain   2017 1:00 PM   Office Visit    Description:  Male : 1932   Provider:  Ayan Resendiz MD   Department:  [facility]           Diagnoses this Visit        Comments    Penile cancer    -  Primary     Regional lymph node metastasis present                To Do List           Goals (5 Years of Data)     None      Follow-Up and Disposition     Return in about 3 months (around 2017).      [facility] On Call     [facility] On Call Nurse Care Line -  Assistance  Unless otherwise directed by your provider, please contact [facility], our nurse care line that is available for  assistance.     Registered nurses in the [facility] On Call Center provide: appointment scheduling, clinical advisement, health education, and other advisory services.  Call: [phone] (toll free)               Medications           Message regarding Medications     Verify the changes and/or additions to your medication regime listed below are the same as discussed with your clinician today.  If any of these changes or additions are incorrect, please notify your healthcare provider.        STOP taking these medications     aspirin (ECOTRIN) 81 MG EC tablet Take 81 mg by mouth once daily.    docusate sodium (COLACE) 50 MG capsule Take 1 capsule (50 mg total) by mouth 2 (two) times daily.    oxycodone-acetaminophen (PERCOCET) 5-325 mg per tablet Take 1 tablet by mouth every 4 (four) hours as needed for Pain.    polyethylene glycol (GLYCOLAX) 17 gram PwPk Take 17 g by mouth once daily.    VIT C/VIT E/LUTEIN/MIN/OMEGA-3 (OCUVITE ORAL) Take by mouth.    silver sulfADIAZINE 1% (SILVADENE) 1 % cream            Verify that the below list of medications is an accurate representation of the medications you are currently taking.  If none reported, the list may be blank. If incorrect, please  "contact your healthcare provider. Carry this list with you in case of emergency.           Current Medications     apixaban (ELIQUIS) 2.5 mg Tab Take 2.5 mg by mouth 2 (two) times daily.    magnesium oxide (MAG-OX) 400 mg tablet Take 400 mg by mouth 2 (two) times daily.           Clinical Reference Information           Your Vitals Were     BP Pulse Resp Height Weight BMI    127/60 78 16 5' 4" (1.626 m) 84.8 kg (186 lb 15.2 oz) 32.09 kg/m2      Blood Pressure          Most Recent Value    BP  127/60      Allergies as of 5/18/2017     Penicillins      Immunizations Administered on Date of Encounter - 5/18/2017     None      Orders Placed During Today's Visit     Future Labs/Procedures Expected by Expires    CBC auto differential  5/18/2017 7/17/2018    Comprehensive metabolic panel  5/18/2017 5/18/2018      [facility] Sign-Up     Activating your [facility] account is as easy as 1-2-3!     1) Visit [email or web address], select Sign Up Now, enter this activation code and your date of birth, then select Next.  [email or web address]  Expires: 7/2/2017  1:26 PM      2) Create a username and password to use when you visit [facility] in the future and select a security question in case you lose your password and select Next.    3) Enter your e-mail address and click Sign Up!    Additional Information  If you have questions, please e-mail [email or web address] or call [phone] to talk to our [facility] staff. Remember, [facility] is NOT to be used for urgent needs. For medical emergencies, dial 911.         Language Assistance Services     ATTENTION: Language assistance services are available, free of charge. Please call [phone].      ATENCIÓN: Si habla lewis, tiene a tinsley disposición servicios gratuitos de asistencia lingüística. Llame al [phone].     CHÚ Ý: N?u b?n nói Ti?ng Vi?t, có các d?ch v? h? tr? ngôn ng? mi?n phí dành cho b?n. G?i s? [phone].         [facility] complies with applicable " Federal civil rights laws and does not discriminate on the basis of race, color, national origin, age, disability, or sex.

## 2017-05-18 NOTE — PROGRESS NOTES
Clinic Note  5/18/2017      Subjective:         Chief Complaint:   SUJATHA Jain is a 84 y.o. male  with recurrent, metastatic penile cancer s/p partial penectomy with clear margins 413/2015 for squamous cell carcinoma. He re-presented in 06/2015 with inguinal adenopathy a suprapubic mass and underwent right superficial and deep inguinal LND and left superficial inguinal LND 7/15/2015. Pathology positive for 4.5 cm focus of SCC in suprapubic area with positive lateral and deep margin, metastatic SCC in 1/9 right inguinal nodes with extranodal extension and 1/6 left inguinal nodes with extranodal extension.       CT of the pelvis on 12/16/15 revealed multiple new soft tissue attenuation lesions noted in the groin and at the base of the penile stump. There is a lesion within the stump of the penis measuring 3.6 cm. This was not present on prior study. A lesion just left of the base of the penis measures up to 5.1 cm in diameter. Right inguinal lesion adjacent to surgical clips measures 2.5 cm in diameter. Heterogeneous collection extending along the left external iliac vessels into the left pelvic sidewall is also seen. These lesions demonstrate internal hypoattenuation suggesting necrosis. These findings were concerning for recurrent disease.       The patient was seen in multidisciplinary clinic in January of this year. We recommended further therapy with chemoradiotherapy. The patient subsequently completed 46 Gy to the pelvis followed by 20 Gy boost to the involved lymph nodes on 3/24/16 at [facility]. He did receive chemotherapy with his radiotherapy but the type is unknown. Since that time, the patient has been doing fairly well without evidence of progression.   Recently noted to have multiple abdominal wall metastatic deposits. Had resection 1/23/2017.  Has right leg DVT on [place].         Past Medical History:   Diagnosis Date    Allergy     Benign colon polyp     Benign  essential hypertension     Cataracts, both eyes     Removed    Deep vein blood clot of right lower extremity     Non-small cell cancer of left lung     Squamous cell carcinoma of penis      Family History   Problem Relation Age of Onset    Diabetes Mother      Social History     Social History    Marital status:      Spouse name: N/A    Number of children: N/A    Years of education: N/A     Occupational History    Not on file.     Social History Main Topics    Smoking status: Former Smoker     Packs/day: 3.00     Years: 40.00     Types: Cigarettes     Quit date: 2/19/1985    Smokeless tobacco: Current User    Alcohol use 0.0 oz/week     0 Standard drinks or equivalent per week      Comment: 1-2 a day    Drug use: No    Sexual activity: Not Currently     Partners: Female     Other Topics Concern    Not on file     Social History Narrative     Past Surgical History:   Procedure Laterality Date    APPENDECTOMY      CATARACT EXTRACTION Bilateral     COLON SURGERY      CYSTOSCOPY      HERNIA REPAIR      LUNG LOBECTOMY Left     Partial lung removed     LYMPH NODE DISSECTION Bilateral 7/15/2015    Bilateral superficial inguinal lymph node dissection    PENIS SURGERY  4/13/2015    Partial penectomy for penile cancer      Patient Active Problem List   Diagnosis    Phimosis    Hyperkalemia    Benign essential hypertension    Dehydration    Regional lymph node metastasis present    Penile cancer    Abdominal wall abscess    Abdominal wall mass     Review of Systems   Constitutional: Positive for unexpected weight change. Negative for appetite change, chills, fatigue and fever.        Has lost 30 pounds since last visit. Good appetite, no pain.   HENT: Negative for nosebleeds.    Respiratory: Negative for shortness of breath and wheezing.    Cardiovascular: Negative for chest pain, palpitations and leg swelling.   Gastrointestinal: Negative for abdominal distention, abdominal pain,  "constipation, diarrhea, nausea and vomiting.   Genitourinary: Negative for dysuria and hematuria.   Musculoskeletal: Negative for arthralgias and back pain.   Skin: Negative for pallor.   Neurological: Negative for dizziness, seizures and syncope.   Hematological: Negative for adenopathy.   Psychiatric/Behavioral: Negative for dysphoric mood.         Objective:      /60  Pulse 78  Resp 16  Ht 5' 4" (1.626 m)  Wt 84.8 kg (186 lb 15.2 oz)  BMI 32.09 kg/m2  Estimated body mass index is 32.09 kg/(m^2) as calculated from the following:    Height as of this encounter: 5' 4" (1.626 m).    Weight as of this encounter: 84.8 kg (186 lb 15.2 oz).  Physical Exam   Constitutional: He is oriented to person, place, and time. He appears well-developed and well-nourished. No distress.   HENT:   Head: Atraumatic.   Neck: No tracheal deviation present.   Cardiovascular: Normal rate.    Pulmonary/Chest: Effort normal. No respiratory distress. He has no wheezes.   Abdominal: Soft. Bowel sounds are normal. He exhibits no distension and no mass. There is no tenderness. There is no rebound and no guarding.   Genitourinary:         Genitourinary Comments: Necrotic tumor   Musculoskeletal:   Bilateral leg swelling   Neurological: He is alert and oriented to person, place, and time.   Skin: Skin is warm and dry. He is not diaphoretic.     Psychiatric: He has a normal mood and affect. His behavior is normal. Judgment and thought content normal.         Assessment and Plan:           Problem List Items Addressed This Visit     Regional lymph node metastasis present    Penile cancer - Primary          Follow up:   CMP, CBC today. Discussed compression hose. 3 months with follow up.    Ayan Reesndiz      "

## 2017-05-31 ENCOUNTER — TELEPHONE (OUTPATIENT)
Dept: UROLOGY | Facility: CLINIC | Age: 82
End: 2017-05-31